# Patient Record
(demographics unavailable — no encounter records)

---

## 2025-01-03 NOTE — PLAN
[FreeTextEntry1] : 1. Vaginal bleeding  -Recent episode of 4 days of bleeding, now dark red.  No previous history of vaginal bleeding.  -Hx of uterine fibroids.  -Transvaginal US ordered. GYN referral given.   2. Chronic cough -States cough present for many years, recent episode of "wet" cough -Chest x-ray ordered.  -PULM referral provided.   3. HCM -Vaccines    -Patient thinks she got Flu shot at [facility],    -Shingles - states she got at [facility] 2023.    -PPSV- states may have gotten recently- possible 2023.- If not due to Prevnar 20.    -Tdap- UTD - due 2026  -Screens    -Dexa- completed 2021- due in 5 years.    -Cervical screen-states been a few years- GYN referral given.    -Mammogram- completed 2022- order placed.

## 2025-01-03 NOTE — HISTORY OF PRESENT ILLNESS
[FreeTextEntry1] : recent vaginal bleeding from 12/22-12/25, stopped and now scant spotting.  Chronic cough- returned 2-3 weeks ago. States cough is more productive, minimal mucus.  [de-identified] : 77 year old female with PMH of asthma, cough, hyperlipidemia, overweight, BCC presents for annual visit.  Overall doing well.  Got a dog- walks her 2x/day. Taking care of toddler grand-nephew, a lot of running behind him.  Hx of asthma- presents as a cough. Previously seen by Pulmonology, never need inhaler. Denies wheezing, shortness of breath.   -Has not had recent skin assessment-due for derm visit   Fx of pancreatic cancer- mom, aunt and cousin. Previous conversation about genetic testing.   HLD- not taking Atorvastatin. Continues to eat lot of cheese, eating fruits and vegetables and grains. Occasional wine with dinner.

## 2025-01-03 NOTE — PHYSICAL EXAM
[No Acute Distress] : no acute distress [Well-Appearing] : well-appearing [PERRL] : pupils equal round and reactive to light [Pedal Pulses Present] : the pedal pulses are present [Soft] : abdomen soft [Non Tender] : non-tender [No CVA Tenderness] : no CVA  tenderness [Normal] : affect was normal and insight and judgment were intact [de-identified] : mild wax in bilateral ears.

## 2025-01-03 NOTE — HEALTH RISK ASSESSMENT
[Good] : ~his/her~  mood as  good [Monthly or less (1 pt)] : Monthly or less (1 point) [1 or 2 (0 pts)] : 1 or 2 (0 points) [Never (0 pts)] : Never (0 points) [No] : In the past 12 months have you used drugs other than those required for medical reasons? No [No falls in past year] : Patient reported no falls in the past year [0] : 2) Feeling down, depressed, or hopeless: Not at all (0) [PHQ-2 Negative - No further assessment needed] : PHQ-2 Negative - No further assessment needed [Never] : Never [Patient reported bone density results were normal] : Patient reported bone density results were normal [With Family] : lives with family [Retired] : retired [Feels Safe at Home] : Feels safe at home [Fully functional (bathing, dressing, toileting, transferring, walking, feeding)] : Fully functional (bathing, dressing, toileting, transferring, walking, feeding) [Fully functional (using the telephone, shopping, preparing meals, housekeeping, doing laundry, using] : Fully functional and needs no help or supervision to perform IADLs (using the telephone, shopping, preparing meals, housekeeping, doing laundry, using transportation, managing medications and managing finances) [de-identified] : no  [Audit-CScore] : 1 [de-identified] : has a dog, goes on walks frequently, [[facility]] : 6 [de-identified] : Regular diet- not a lot of meat. fish and chicken mostly.  [IAM6Cdnxr] : 0 [Reports changes in hearing] : Reports no changes in hearing [Reports changes in vision] : Reports no changes in vision [Reports normal functional visual acuity (ie: able to read med bottle)] : Reports poor functional visual acuity.  [Reports changes in dental health] : Reports no changes in dental health [MammogramDate] : 10/13/2021 [MammogramComments] : mammo- in 1 year.  [PapSmearDate] : 2022 [BoneDensityDate] : 2021 [BoneDensityComments] : due 2026 [ColonoscopyDate] : 2021 [ColonoscopyComments] : due 2026

## 2025-01-03 NOTE — REVIEW OF SYSTEMS
[Cough] : cough [Negative] : Psychiatric [Shortness Of Breath] : no shortness of breath [Wheezing] : no wheezing [Dyspnea on Exertion] : no dyspnea on exertion [FreeTextEntry3] : hx of astigmatism, wears glasses for reading.

## 2025-01-28 NOTE — HISTORY OF PRESENT ILLNESS
[Never] : never [TextBox_4] : 77F with HLD  cough x 15 years no obvious trigger, usually daily, sometimes productive of clear sputum able to walk/stairs etc without issue, no dyspnea/cp/wheeze feels maybe its related to PND no gerd non smoker has gone away for months at a time usually in warmer weather.

## 2025-01-28 NOTE — ASSESSMENT
[FreeTextEntry1] : trial of flonase for PND reassess in 3 weeks if no change will try ICS  A total of 45 minutes was spent on this encounter including history taking, chart review, physical examination, testing interpretation and treatment plan.

## 2025-01-28 NOTE — PROCEDURE
[FreeTextEntry1] : PFT: Normal spirometry.  Lung volumes normal. DLCO normal.    	 EXAM: [id number] - XR CHEST PA LAT 2V  - ORDERED BY: SIS RUIZ   PROCEDURE DATE:  01/20/2025    INTERPRETATION:  CLINICAL INDICATION: Chronic cough worsening over the past 3 weeks.  TECHNIQUE:  2 views of the chest.  COMPARISON: 2 views of the chest 8/12/2021.  FINDINGS:  Lines/Tubes/Support Devices: None.  Lungs/Pleura: No focal parenchymal opacities.  No pneumothorax. No pleural effusions.  No pulmonary vascular congestion.  Heart/Mediastinum: Trachea is midline. The cardiomediastinal silhouette is within normal limits.  Skeletal/soft tissues: No acute pathology.  IMPRESSION: Clear lungs.  --- End of Report ---      GREG GUZMAN MD; Resident Radiologist This document has been electronically signed. CLAUDIA ADAMS MD; Attending Radiologist This document has been electronically signed. Jan 21 2025  7:45PM

## 2025-02-25 NOTE — PROCEDURE
[FreeTextEntry1] : Reviewed:  PFT: Normal spirometry.  Lung volumes normal. DLCO normal.    	 EXAM: [id number] - XR CHEST PA LAT 2V  - ORDERED BY: SIS RUIZ   PROCEDURE DATE:  01/20/2025    INTERPRETATION:  CLINICAL INDICATION: Chronic cough worsening over the past 3 weeks.  TECHNIQUE:  2 views of the chest.  COMPARISON: 2 views of the chest 8/12/2021.  FINDINGS:  Lines/Tubes/Support Devices: None.  Lungs/Pleura: No focal parenchymal opacities.  No pneumothorax. No pleural effusions.  No pulmonary vascular congestion.  Heart/Mediastinum: Trachea is midline. The cardiomediastinal silhouette is within normal limits.  Skeletal/soft tissues: No acute pathology.  IMPRESSION: Clear lungs.  --- End of Report ---      GREG GUZMAN MD; Resident Radiologist This document has been electronically signed. CLAUDIA ADAMS MD; Attending Radiologist This document has been electronically signed. Jan 21 2025  7:45PM

## 2025-03-06 NOTE — PLAN
[FreeTextEntry1] : Routine Gyn Exam: BSA, calcium, vitamin D and exercise d/w pt Pap smear/HPV conducted Rx given for mammogram and breast sonogram  Colonoscopy UTD DEXA d/w patient, Rx given Advised pt to see PCP and dermatologist annually  PMB: Endometrial Biopsy conducted today I will call pt with biopsy results in 5-7 days She is advised to call me if she experiences heavy vaginal bleeding, fever, chills or severe pain Discussed possible hysteroscopy D&C vs SHG pending embx results  RTO in 1 year or PRN

## 2025-03-06 NOTE — PROCEDURE
[Cervical Pap Smear] : cervical Pap smear [Liquid Base] : liquid base [Endometrial Biopsy] : Endometrial biopsy [Time out performed] : Pre-procedure time out performed.  Patient's name, date of birth and procedure confirmed. [Consent Obtained] : Consent obtained [Post-Menop. Bleeding] : post-menopausal bleeding [Risks] : risks [Benefits] : benefits [Alternatives] : alternatives [Patient] : patient [Infection] : infection [Bleeding] : bleeding [Allergic Reaction] : allergic reaction [Uterine Perforation] : uterine perforation [Pain] : pain [Betadine] : Betadine [None] : none [Tenaculum] : Tenaculum [Easy Passage] : Easy passage [Moderate] : moderate [Specimen Collected] : collected [Sent to Pathology] : placed in buffered formalin and sent for pathology [Tolerated Well] : Patient tolerated the procedure well [No Complications] : No complications [Sounded to ___ cm] : sounded to [unfilled] ~Ucm [Mid Position] : mid position [de-identified] : PM [de-identified] : brown

## 2025-03-06 NOTE — REVIEW OF SYSTEMS
Patient identification verified with 2 identifiers.    Location: [facility] - [address] [place] [id number] [phone]     Referring Physician: Dr. Anirudh Rehman  Enrollment/ Re-enrollment date: 2025   INR Goal: 2.5-3.5  INR monitoring is per [facility] protocol.  Anticoagulation Medication: warfarin  Indication: Aortic Valve Replacement    Subjective   Bleeding signs/symptoms: No    Bruising: No   Major bleeding event: No  Thrombosis signs/symptoms: No  Thromboembolic event: No  Missed doses: No  Extra doses: No  Medication changes: No  Dietary changes: No  Change in health: No  Change in activity: No  Alcohol: No  Other concerns: No    Upcoming Procedures:  Does the Patient Have any upcoming procedures that require interruption in anticoagulation therapy? no  Does the patient require bridging? no      Anticoagulation Summary  As of 2024      INR goal:  2.5-3.5   TTR:  38.9% (1.2 y)   INR used for dosin.50 (2024)   Weekly warfarin total:  17 mg               Assessment/Plan   Subtherapeutic     1. New dose:  Increase TWD     2. Next INR: 1 week      Education provided to patient during the visit:  Patient instructed to call in interim with questions, concerns and changes.   Patient educated on interactions between medications and warfarin.   Patient educated on dietary consistency in vitamin k consumption.   Patient educated on affects of alcohol consumption while taking warfarin.   Patient educated on signs of bleeding/clotting.   Patient educated on compliance with dosing, follow up appointments, and prescribed plan of care.         [Patient Intake Form Reviewed] : Patient intake form was reviewed [Negative] : Endocrine

## 2025-03-06 NOTE — PHYSICAL EXAM
[Chaperone Present] : A chaperone was present in the examining room during all aspects of the physical examination [Appropriately responsive] : appropriately responsive [Alert] : alert [No Acute Distress] : no acute distress [No Lymphadenopathy] : no lymphadenopathy [Regular Rate Rhythm] : regular rate rhythm [No Murmurs] : no murmurs [Clear to Auscultation B/L] : clear to auscultation bilaterally [Soft] : soft [Non-tender] : non-tender [Non-distended] : non-distended [No HSM] : No HSM [No Lesions] : no lesions [No Mass] : no mass [Oriented x3] : oriented x3 [Examination Of The Breasts] : a normal appearance [No Masses] : no breast masses were palpable [Vulvar Atrophy] : vulvar atrophy [Labia Majora] : normal [Labia Minora] : normal [Atrophy] : atrophy [Scant] : There was scant vaginal bleeding [Normal] : normal [Uterine Adnexae] : normal [FreeTextEntry2] : [facility]) [FreeTextEntry9] : no masses, guaiac negative

## 2025-03-06 NOTE — HISTORY OF PRESENT ILLNESS
[FreeTextEntry1] : 03/06/2025. KVNG VERGARA 77 year old female G0 LMP PM. She presents to establish gyn care and for an annual gyn exam.    Pt reports vaginal bleeding that starte in 01/2025. Pelvic sono ordered by PCP @ that time showed large fluid distending the EM cavity with internal debris or blood products. Bleeding was first thin and red in nature, has now subsided to brown staining. No cramping.  PLTs and WBCs were elevated on labs drawn 01/2025. Pt saw heme recently.   She denies abdominal and pelvic pain, no vaginal discharge or vaginitis symptoms. She reports normal urination, no dysuria, no incontinence of urine. BM is normal per patient, no blood in stool or constipation/diarrhea.    Obhx: G0 GYNhx: fibroids, ovarian cysts. Denies history of abnl pap, STI, pelvic infection, breast issues PMH: HLD, asthma, basal and squamous cell carcinoma, chronic cough PSH: endometrial ablation Med: Flonase, Arnuity Ellipta (inhaler), Atorvastatin All: NKDA Famhx: mother - uterine in 60s and pancreatic ca at 86 y/o, sister- lymphoma at age 42 and 65. Denies FHx of breast, ovarian, colon, or prostate cancer. Soc Hx: rare alc use, nonsmoker, no T/D. PHQ9 = 1  [TextBox_4] : pelvic sono 01/25 - fibroid uterus (largest 3 cm anterior submucosal), R ovary w/ multiple small cysts measuring up to 9mm, large fluid distending EM cavity with internal debris or blood products, EM lining os otherwise thin  [Mammogramdate] : 01/25 [TextBox_19] : BIRADS 2 [BreastSonogramDate] : 01/25 [TextBox_25] : BIRADS 2 [BoneDensityDate] : 08/21 [TextBox_37] : normal, Rx given [ColonoscopyDate] : 11/21 [TextBox_43] : polyp removed, repeat 11/26 [No] : Patient does not have concerns regarding sex